# Patient Record
Sex: MALE | Race: OTHER
[De-identification: names, ages, dates, MRNs, and addresses within clinical notes are randomized per-mention and may not be internally consistent; named-entity substitution may affect disease eponyms.]

---

## 2019-06-06 VITALS — SYSTOLIC BLOOD PRESSURE: 136 MMHG | DIASTOLIC BLOOD PRESSURE: 70 MMHG

## 2020-01-22 ENCOUNTER — HOSPITAL ENCOUNTER (OUTPATIENT)
Dept: HOSPITAL 61 - ECHO | Age: 80
Discharge: HOME | End: 2020-01-22
Attending: INTERNAL MEDICINE
Payer: MEDICARE

## 2020-01-22 DIAGNOSIS — I50.22: ICD-10-CM

## 2020-01-22 DIAGNOSIS — I08.8: Primary | ICD-10-CM

## 2020-01-22 PROCEDURE — 93306 TTE W/DOPPLER COMPLETE: CPT

## 2020-01-22 NOTE — CARD
MR#: [id number]

Account#: [id number]

Accession#: [id number]

Date of Study: 01/22/2020

Ordering Physician: PIETER HINKLE, 

Referring Physician: PIETER HINKLE, 

Tech: Johanna Buckner RDCS





--------------- APPROVED REPORT --------------





EXAM: Two-dimensional and M-mode echocardiogram with Doppler and color Doppler.



Other Information 

Quality : AverageHR: 54bpm

Rhythm : NSR



INDICATION

Hypertension/HCVD

DM



2D DIMENSIONS 

RVDd3.0 (2.9-3.5cm)IVSd0.9 (0.7-1.1cm)

Aortic Root(2D)3.6 (2.0-3.7cm)LVDd5.2 (3.9-5.9cm)

LVOT Diameter2.2 (1.8-2.4cm)PWd1.1 (0.7-1.1cm)

LVDs4.2 (2.5-4.0cm)FS (%) 18.1 %

SV47.3 mlLVEF(%)37.3 (>50%)



Aortic Valve

AoV Peak Sylvain.101.7cm/Tyra Peak GR.4.1mmHg

LVOT Peak Sylvain.85.6cm/sAVA (VMAX)3.29cm2



Mitral Valve

MV E Hteducis23.2cm/sMV DECEL [id number]

MV A Kixhabdr19.6cm/sE/A  Ratio0.4

MV A Ogohgaha131kt



Pulmonary Valve

PV Peak Cvggfraw36.0cm/s



Tricuspid Valve

TR P. Dssiuvzf574ro/sTR Peak Gr.22mmHg



Pulmonary Vein

S1 Rbpejmrd64.9cm/sD2 Ntqkuqbi28.0cm/s

PVa [id number]



 LEFT VENTRICLE 

The left ventricle is normal size. There is normal left ventricular wall thickness. The left ventricu
lar systolic function is moderately diminished.  Severe hypokinesis of basal inferior and posterior w
alls. The ejection fraction is estimated at 30-35%. Transmitral Doppler flow pattern is Grade I-abnor
mal relaxation pattern. There is no ventricular septal defect visualized.



 RIGHT VENTRICLE 

The right ventricle is normal size. There is normal right ventricular wall thickness. The right ventr
icular systolic function is normal.



 ATRIA 

The left atrium size is normal. The right atrium size is normal. The interatrial septum is intact wit
h no evidence for an atrial septal defect or patent foramen ovale as noted on 2-D or Doppler imaging.




 AORTIC VALVE 

The aortic valve is normal in structure and function. Doppler and Color Flow revealed trace to mild a
ortic regurgitation. There is no significant aortic valvular stenosis.



 MITRAL VALVE 

The mitral valve is normal in structure and function. There is no evidence of mitral valve prolapse. 
There is no mitral valve stenosis. Doppler and Color-flow revealed mild mitral regurgitation.



 TRICUSPID VALVE 

The tricuspid valve is normal in structure and function. Doppler and Color Flow revealed mild tricusp
id regurgitation. PAP is estimated at 25 mmHg. There is no tricuspid valve stenosis.



 PULMONIC VALVE 

The pulmonary valve is normal in structure and function. Doppler and Color Flow revealed trace to mil
d pulmonic valvular regurgitation. There is no pulmonic valvular stenosis.



 GREAT VESSELS 

The aortic root is normal in size. The ascending aorta is normal in size. The IVC is normal in size a
nd collapses >50% with inspiration.



 PERICARDIAL EFFUSION 

There is no evidence of significant pericardial effusion.



Critical Notification

Critical Value: No



<Conclusion>

The left ventricular systolic function is moderately diminished.  

Severe hypokinesis of basal inferior and posterior walls.

The ejection fraction is estimated at 30-35%.

Transmitral Doppler flow pattern is Grade I-abnormal relaxation pattern.

Trace to mild aortic regurgitation.

Mild mitral regurgitation.

Mild tricuspid regurgitation. PAP is estimated at 25 mmHg.

There is no evidence of significant pericardial effusion.



Signed by : Pieter Hinkle, 

Electronically Approved : 01/22/2020 15:14:06

## 2020-03-09 ENCOUNTER — HOSPITAL ENCOUNTER (OUTPATIENT)
Dept: HOSPITAL 61 - SURG | Age: 80
Setting detail: OBSERVATION
LOS: 1 days | Discharge: HOME HEALTH | End: 2020-03-10
Attending: INTERNAL MEDICINE | Admitting: INTERNAL MEDICINE
Payer: MEDICARE

## 2020-03-09 VITALS — DIASTOLIC BLOOD PRESSURE: 86 MMHG | SYSTOLIC BLOOD PRESSURE: 187 MMHG

## 2020-03-09 VITALS — SYSTOLIC BLOOD PRESSURE: 172 MMHG | DIASTOLIC BLOOD PRESSURE: 76 MMHG

## 2020-03-09 VITALS — DIASTOLIC BLOOD PRESSURE: 76 MMHG | SYSTOLIC BLOOD PRESSURE: 152 MMHG

## 2020-03-09 VITALS — SYSTOLIC BLOOD PRESSURE: 176 MMHG | DIASTOLIC BLOOD PRESSURE: 85 MMHG

## 2020-03-09 VITALS — SYSTOLIC BLOOD PRESSURE: 182 MMHG | DIASTOLIC BLOOD PRESSURE: 80 MMHG

## 2020-03-09 VITALS — SYSTOLIC BLOOD PRESSURE: 164 MMHG | DIASTOLIC BLOOD PRESSURE: 79 MMHG

## 2020-03-09 VITALS — SYSTOLIC BLOOD PRESSURE: 187 MMHG | DIASTOLIC BLOOD PRESSURE: 86 MMHG

## 2020-03-09 VITALS — DIASTOLIC BLOOD PRESSURE: 84 MMHG | SYSTOLIC BLOOD PRESSURE: 190 MMHG

## 2020-03-09 VITALS — DIASTOLIC BLOOD PRESSURE: 79 MMHG | SYSTOLIC BLOOD PRESSURE: 167 MMHG

## 2020-03-09 VITALS — DIASTOLIC BLOOD PRESSURE: 71 MMHG | SYSTOLIC BLOOD PRESSURE: 159 MMHG

## 2020-03-09 VITALS — HEIGHT: 62 IN | WEIGHT: 129.19 LBS | BODY MASS INDEX: 23.77 KG/M2

## 2020-03-09 VITALS — SYSTOLIC BLOOD PRESSURE: 181 MMHG | DIASTOLIC BLOOD PRESSURE: 86 MMHG

## 2020-03-09 VITALS — SYSTOLIC BLOOD PRESSURE: 145 MMHG | DIASTOLIC BLOOD PRESSURE: 72 MMHG

## 2020-03-09 DIAGNOSIS — I44.7: ICD-10-CM

## 2020-03-09 DIAGNOSIS — Z79.01: ICD-10-CM

## 2020-03-09 DIAGNOSIS — I50.22: ICD-10-CM

## 2020-03-09 DIAGNOSIS — I11.0: ICD-10-CM

## 2020-03-09 DIAGNOSIS — I25.5: Primary | ICD-10-CM

## 2020-03-09 DIAGNOSIS — I25.10: ICD-10-CM

## 2020-03-09 DIAGNOSIS — I48.0: ICD-10-CM

## 2020-03-09 LAB
ANION GAP SERPL CALC-SCNC: 10 MMOL/L (ref 6–14)
BUN SERPL-MCNC: 19 MG/DL (ref 8–26)
CALCIUM SERPL-MCNC: 8.3 MG/DL (ref 8.5–10.1)
CHLORIDE SERPL-SCNC: 107 MMOL/L (ref 98–107)
CO2 SERPL-SCNC: 26 MMOL/L (ref 21–32)
CREAT SERPL-MCNC: 1 MG/DL (ref 0.7–1.3)
ERYTHROCYTE [DISTWIDTH] IN BLOOD BY AUTOMATED COUNT: 13.6 % (ref 11.5–14.5)
GFR SERPLBLD BASED ON 1.73 SQ M-ARVRAT: 72.1 ML/MIN
GLUCOSE SERPL-MCNC: 94 MG/DL (ref 70–99)
HCT VFR BLD CALC: 37.4 % (ref 39–53)
HGB BLD-MCNC: 12.7 G/DL (ref 13–17.5)
MCH RBC QN AUTO: 33 PG (ref 25–35)
MCHC RBC AUTO-ENTMCNC: 34 G/DL (ref 31–37)
MCV RBC AUTO: 97 FL (ref 79–100)
PLATELET # BLD AUTO: 148 X10^3/UL (ref 140–400)
POTASSIUM SERPL-SCNC: 3.5 MMOL/L (ref 3.5–5.1)
PROTHROMBIN TIME: 14.4 SEC (ref 11.7–14)
RBC # BLD AUTO: 3.88 X10^6/UL (ref 4.3–5.7)
SODIUM SERPL-SCNC: 143 MMOL/L (ref 136–145)
WBC # BLD AUTO: 4.9 X10^3/UL (ref 4–11)

## 2020-03-09 PROCEDURE — 33249 INSJ/RPLCMT DEFIB W/LEAD(S): CPT

## 2020-03-09 PROCEDURE — C1769 GUIDE WIRE: HCPCS

## 2020-03-09 PROCEDURE — G0378 HOSPITAL OBSERVATION PER HR: HCPCS

## 2020-03-09 PROCEDURE — G0379 DIRECT REFER HOSPITAL OBSERV: HCPCS

## 2020-03-09 PROCEDURE — 85027 COMPLETE CBC AUTOMATED: CPT

## 2020-03-09 PROCEDURE — 93005 ELECTROCARDIOGRAM TRACING: CPT

## 2020-03-09 PROCEDURE — C1882 AICD, OTHER THAN SING/DUAL: HCPCS

## 2020-03-09 PROCEDURE — C1898 LEAD, PMKR, OTHER THAN TRANS: HCPCS

## 2020-03-09 PROCEDURE — 85610 PROTHROMBIN TIME: CPT

## 2020-03-09 PROCEDURE — 71045 X-RAY EXAM CHEST 1 VIEW: CPT

## 2020-03-09 PROCEDURE — 71046 X-RAY EXAM CHEST 2 VIEWS: CPT

## 2020-03-09 PROCEDURE — 82962 GLUCOSE BLOOD TEST: CPT

## 2020-03-09 PROCEDURE — 33225 L VENTRIC PACING LEAD ADD-ON: CPT

## 2020-03-09 PROCEDURE — 75820 VEIN X-RAY ARM/LEG: CPT

## 2020-03-09 PROCEDURE — 80048 BASIC METABOLIC PNL TOTAL CA: CPT

## 2020-03-09 PROCEDURE — 96374 THER/PROPH/DIAG INJ IV PUSH: CPT

## 2020-03-09 PROCEDURE — 36415 COLL VENOUS BLD VENIPUNCTURE: CPT

## 2020-03-09 PROCEDURE — 96376 TX/PRO/DX INJ SAME DRUG ADON: CPT

## 2020-03-09 PROCEDURE — C1895 LEAD, AICD, ENDO DUAL COIL: HCPCS

## 2020-03-09 PROCEDURE — 93641 EP EVL 1/2CHMB PAC CVDFB TST: CPT

## 2020-03-09 PROCEDURE — C1900 LEAD, CORONARY VENOUS: HCPCS

## 2020-03-09 RX ADMIN — METOPROLOL TARTRATE SCH MG: 50 TABLET, FILM COATED ORAL at 17:35

## 2020-03-09 RX ADMIN — Medication SCH MG: at 20:30

## 2020-03-09 NOTE — RAD
AP portable chest  radiograph 3/9/2020

 

Clinical History: Post pacemaker placement.

 

An AP erect portable digital radiograph of the chest was obtained.

 

Comparison study is dated 6/5/2019.

 

A pacemaker is been placed into the left anterior chest. Leads extend to 

overlie the right atrium and right ventricle of the heart. The cardiac 

silhouette is mildly enlarged. The thoracic aorta is mildly tortuous. No 

pneumothorax or pleural effusion is seen. Prominence of pulmonary 

vasculature is seen suggesting mild CHF. Degenerative changes are seen 

involving the thoracic long the lumbar spine.

 

IMPRESSION: Post pacemaker placement. No pneumothorax is seen.

 

Electronically signed by: Torres Milner MD (3/9/2020 3:01 PM) [facility] [place]

## 2020-03-09 NOTE — CARD
MR#: [id number]

Account#: [id number]

Accession#: [id number]

Date of Study: 03/09/2020

Ordering Physician: PIETER HINKLE,

Referring Physician: PIETER HINKLE,

Tech: 





--------------- APPROVED REPORT --------------





EXAM

Implantation of Biotronik biventricular implantable cardioverter defibrillator/cardiac resynchronizat
ion therapy-defibrillation.

Defibrillation threshold regiment at the time of implantation.



FL TIME: 43.3 MINS

DOSE: 176 GYCM2

CONTRAST: 75 ML



INDICATIONS

Primary prevention of sudden cardiac death and cardiac resynchronization therapy in a patient with ch
ronic systolic heart failure, ischemic cardiomyopathy and cardiac dyssynchrony as evidenced by prolon
ged QRS interval



IMPLANTED DEVICES

After explaining the risks, benefits and alternative options, informed consent was obtained from rebecca
ent. Patient was brought to the cardiac Cath Lab and his left chest and shoulder were prepped and estephania
ped in the usual fashion. 20 mL of 2% lidocaine was infiltrated into the skin and subcutaneous tissue
s for local anesthesia. An incision was made over the left infraclavicular fossa and using blunt diss
ection and cautery a pocket was created. Venous access was obtained the left subclavian vein with the
 help of a venogram after attempts using fluoroscopy guidance failed, and 9 Cameroonian coronary sinus she
ath was inserted. Contrast injections were performed within the right atrium using CASS2 catheter and
 coronary sinus was engaged. With the balloontipped catheter inflated, venogram was obtained to ident
mai the cardiac veins for placement of left ventricular lead. We initially placed the left ventricle 
lead in the middle cardiac vein but after the sheath was removed the lead dislodged probably secondar
y to acute takeoff of the vein. We then tried the small cardiac vein but the thresholds were high. Fi
christian, a Biotronik quadripolar left ventricular lead model Sentus Pro MRI OTW QP L85, serial #[id number]
[id number] was positioned in the posterolateral vein.



Venous access was again obtained and 10.5 and 6 Cameroonian sheath inserted. A Biotronik bipolar active fi
xation right ventricle lead model Plexa ProMRI, serial #[id number] was positioned in the right ventricu
lar apex under fluoroscopy guidance. Finally, a Biotronik bipolar active fixation right atrial lead m
joselyn Solia serial #[id number] was positioned in the right atrial appendage under fluoroscopy guidance. 
The leads were secured into place and attached to a Biotronik biventricular ICD/CRT-D generator model
 Ilivia 7HF-T QP serial number [id number].  The generator was placed in the pocket that was subsequentl
y closed in 3 layers. Hemostasis was secured.



Ventricular fibrillation was then induced to check the defibrillation threshold. Patient successfully
 converted to sinus rhythm with 15 J shock therapy with a shock impedance of 65 ohms. The right atria
l lead showed a sensing amplitude of 4 mV, impedance of 510 ohms and a threshold of 0.5 was. The righ
t ventricular lead showed a sensing amplitude of 20 mV, impedance of 700 ohms and a threshold of 0.4 
V. The left ventricular lead showed a sensing amplitude of 10 mV, impedance of 860 ohms and a thresho
ld of 0.5 V. Patient tolerated the procedure well. There were no immediate complications.



CONCLUSION

Successful implantation of Biotronik biventricular ICD/CRT-D for primary prevention of sudden cardiac
 death and cardiac resynchronization therapy in a patient with chronic systolic heart failure and pro
longed QRS interval. Defibrillation thresholds were measured at the time of implantation.



Signed by : Pieter Hinkle, 

Electronically Approved : 03/09/2020 14:45:10

## 2020-03-09 NOTE — EKG
[facility]

              [address] [place] [address]

Test Date:    2020               Test Time:    10:22:56

Pat Name:     JOHN ANN      Department:   

Patient ID:   [id number]           Room:          

Gender:       M                        Technician:   

:          [id number]               Requested By: PIETER HINKLE

Order Number: [id number]           Reading MD:   Pieter Hinkle

                                 Measurements

Intervals                              Axis          

Rate:         53                       P:            30

DC:           210                      QRS:          -32

QRSD:         144                      T:            40

QT:           478                                    

QTc:          451                                    

                           Interpretive Statements

SINUS RHYTHM

ABNORMAL LEFT AXIS DEVIATION

LEFT ANTERIOR FASCICULAR BLOCK

NON SPECIFIC INTRAVENTRICULAR BLOCK



Electronically Signed On 3- 8:53:19 CDT by Pieter Hinkle

## 2020-03-10 VITALS — SYSTOLIC BLOOD PRESSURE: 174 MMHG | DIASTOLIC BLOOD PRESSURE: 81 MMHG

## 2020-03-10 VITALS — DIASTOLIC BLOOD PRESSURE: 80 MMHG | SYSTOLIC BLOOD PRESSURE: 160 MMHG

## 2020-03-10 VITALS
SYSTOLIC BLOOD PRESSURE: 162 MMHG | DIASTOLIC BLOOD PRESSURE: 77 MMHG | DIASTOLIC BLOOD PRESSURE: 77 MMHG | SYSTOLIC BLOOD PRESSURE: 162 MMHG

## 2020-03-10 RX ADMIN — Medication SCH MG: at 09:11

## 2020-03-10 RX ADMIN — METOPROLOL TARTRATE SCH MG: 50 TABLET, FILM COATED ORAL at 09:11

## 2020-03-10 NOTE — NUR
Discharge: Teaching verbal and written. Patient primarily Upper sorbian speaking. daughter 
translated discharge instructions. Patient and daughters verbalized understanding. Reviewed 
pacemaker precaution and education, wound care, labs, ect. Prescription for follow-up labs 
send with patient. Prescription for entreso sent to pharmacy by Virgie. All belongings with 
patient. Left arm immobilizer in place. Pacemaker box and card with patient. patient 
ambulated off of unit via wheelchair accompanied by nurse and daughter.

## 2020-03-10 NOTE — SNU/HH DC
DISCHARGE WITH HOME HEALTH


DISCHARGE INFORMATION:


Discharge Date:  Mar 10, 2020


Final Diagnosis:


ICM, chronic LBBB, CAD, HTN, HLP, S/P CRT-D placement


Condition on Discharge:  Stable





CODE STATUS:


Code Status:  Full





HOME HEALTH:


Face to Face:


I certify this patient is under my care and that I, or a nurse practitioner or 

physician's assistant working with me, had a face to face encounter that meets 

the physician face to face encounter requirements with this patient on [].


Medical Complications:  CHF, HTN, MI


RN For Eval/Treatment:  Yes


Physical Therapy For:  Evalulation/Treatment


Occupational Therapy For:  Evaluation/Treatment


Home Health Aide For:  Self-care


MSW For:  Community Resources


Pt Meets Homebound Status:  Unsteady balance w/ amb,





POST DISCHARGE ORDERS:


Activity Instructions for Disc:  Activity as tolerated, Other, see below (see 

post AICD placement pt.  instructions )


Weight Bearing Status after Di:  Full weight bearing


Other wound/incision instructi:  Keep surgical wound ALYSA





CHECKS AFTER DISCHARGE:


Checks after discharge:  Check blood press - daily, Weigh Yourself Daily


Comment:  2L Fluid restriction





FOLLOW-UP:


Follow up with:  with cardiology nurse on 3/24 at 1030 for wound check 

medication reeeval


Additional Instructions:


INR per PCP.  BMP in 1-2 weeks





TREATMENT/EQUIPMENT ORDERS:


Adaptive Equipment Issued:  None





CERTIFICATION STATEMENT:


Certification Statement:


Certification Statement: Based on the above finding, I certify that this patient

 is confined to the home and needs intermittent skilled nursing care, physical 

therapy and/or speech therapy, or continues to need occupational therapy.~ This 

patient is under my care, and I have initiated the establishment of the plan of 

care.~ This patient will be followed by myself or a community physician who will

 periodically review the plan of care.


Home Meds


Active Scripts


Sacubitril/Valsartan (Entresto 49 mg-51 mg Tablet) 1 Each Tablet, 1 EACH PO BID 

for ICM for 30 Days, #60 TAB 2 Refills


   Prov:JULI CAST         3/10/20


Ferrous Sulfate (FERROUS SULFATE) 325 Mg Tablet, 1 TAB PO BID for anemia, #60 

TAB 3 Refills


   Prov:JONATHAN LU MD         6/6/19


Furosemide (FUROSEMIDE) 40 Mg Tablet, 40 MG PO DAILY for chf for 30 Days, #30 

TAB


   Prov:JONATHAN LU MD         6/6/19


Reported Medications


Metformin Hcl (METFORMIN HCL) 500 Mg Tablet, 500 MG PO BIDWMEALS for ANTI-

DIABETIC, TAB 0 Refills


   6/4/19


Atorvastatin Calcium (ATORVASTATIN CALCIUM) 20 Mg Tablet, 20 MG PO HS for FOR 

CHOLESTEROL, #30 TAB 0 Refills


   6/4/19


Metoprolol Tartrate (METOPROLOL TARTRATE) 50 Mg Tablet, 1 TAB PO BID for htn, 

#60 TAB 5 Refills


   6/4/19


Warfarin Sodium (WARFARIN SODIUM) 2 Mg Tablet, 2 MG PO DAILY for blood thinner, 

#30 TAB


   6/4/19


Mirabegron (MYRBETRIQ) 25 Mg Tab.er.24h, 25 MG PO DAILY for bladder control, 

TAB.SR


   6/4/19


Tamsulosin Hcl (FLOMAX) 0.4 Mg Cap.er.24h, 2 CAP PO DAILY for retention, #30 CAP

 11 Refills


   6/4/19


Finasteride (FINASTERIDE) 5 Mg Tablet, 1 TAB PO DAILY for BPH, #30 TAB 11 

Refills


   6/4/19


Discontinued Reported Medications


Sacubitril/Valsartan (Entresto 24 mg-26 mg Tablet) 1 Each Tablet, 1 EACH PO 

DAILY for heart, TAB


   3/9/20


Cephalexin (CEPHALEXIN) 500 Mg Tablet, 500 MG PO BID for urinary infection, TAB


   3/9/20











JULI CAST          Mar 10, 2020 13:46 Requested Prescriptions     Pending Prescriptions Disp Refills    cloNIDine (CATAPRES) 0.3 MG tablet [Pharmacy Med Name: cloNIDine HCL 0.3 MG TABLET] 90 tablet 0     Sig: TAKE ONE TABLET BY MOUTH THREE TIMES A DAY     Last Fill Date: 08/02/2017  Last OV: 08/02/2017

## 2020-03-10 NOTE — PDOC3
JULI CAST APRN 3/10/20 1221:


Discharge Summary


Visit Information


Date of Admission:  Mar 9, 2020


Date of Discharge:  Mar 10, 2020


Admitting Diagnosis:  chronic LBBB, ICM, chronic systolic CHF, CAD, PAFIB


Final Diagnosis


S/P BiV ICD, chronic LBBB, ICM, chronic systolic CHF, CAD, PAFIB





Brief Hospital Course


Allergies





                                    Allergies








Coded Allergies Type Severity Reaction Last Updated Verified


 


  No Known Drug Allergies    6/4/19 No








Vital Signs





Vital Signs








  Date Time  Temp Pulse Resp B/P (MAP) Pulse Ox O2 Delivery O2 Flow Rate FiO2


 


3/10/20 09:11  65      


 


3/10/20 08:00      Room Air  


 


3/10/20 07:00 98.3  20 160/80 (106) 96   





 98.3       


 


3/9/20 15:40       2.0 








Lab Results





Laboratory Tests








Test


 3/9/20


10:15 3/9/20


17:14 3/9/20


20:46 3/10/20


07:42


 


White Blood Count


 4.9 x10^3/uL


(4.0-11.0) 


 


 





 


Red Blood Count


 3.88 x10^6/uL


(4.30-5.70) 


 


 





 


Hemoglobin


 12.7 g/dL


(13.0-17.5) 


 


 





 


Hematocrit


 37.4 %


(39.0-53.0) 


 


 





 


Mean Corpuscular Volume 97 fL ()    


 


Mean Corpuscular Hemoglobin 33 pg (25-35)    


 


Mean Corpuscular Hemoglobin


Concent 34 g/dL


(31-37) 


 


 





 


Red Cell Distribution Width


 13.6 %


(11.5-14.5) 


 


 





 


Platelet Count


 148 x10^3/uL


(140-400) 


 


 





 


Prothrombin Time


 14.4 SEC


(11.7-14.0) 


 


 





 


Prothromb Time International


Ratio 1.2 (0.8-1.1) 


 


 


 





 


Sodium Level


 143 mmol/L


(136-145) 


 


 





 


Potassium Level


 3.5 mmol/L


(3.5-5.1) 


 


 





 


Chloride Level


 107 mmol/L


() 


 


 





 


Carbon Dioxide Level


 26 mmol/L


(21-32) 


 


 





 


Anion Gap 10 (6-14)    


 


Blood Urea Nitrogen


 19 mg/dL


(8-26) 


 


 





 


Creatinine


 1.0 mg/dL


(0.7-1.3) 


 


 





 


Estimated GFR


(Cockcroft-Gault) 72.1 


 


 


 





 


Glucose Level


 94 mg/dL


(70-99) 


 


 





 


Calcium Level


 8.3 mg/dL


(8.5-10.1) 


 


 





 


Glucose (Fingerstick)


 


 116 mg/dL


(70-99) 131 mg/dL


(70-99) 69 mg/dL


(70-99)


 


Test


 3/10/20


11:30 


 


 





 


Glucose (Fingerstick)


 155 mg/dL


(70-99) 


 


 











Laboratory Tests








Test


 3/9/20


17:14 3/9/20


20:46 3/10/20


07:42 3/10/20


11:30


 


Glucose (Fingerstick)


 116 mg/dL


(70-99) 131 mg/dL


(70-99) 69 mg/dL


(70-99) 155 mg/dL


(70-99)








Brief Hospital Course


Mr. Ayala  is a 80 yo  male admitted for planned CRT-D placement.

His prior EF was 30-35%. He had a successful implantation of Biotronik 

biventricular ICD/CRT-D for primary prevention of sudden cardiac death and 

cardiac resynchronization therapy in a patient with chronic systolic heart 

failure and prolonged QRS interval. He tolerated the procedure well without 

immediate complications.  CXR with no pneumothorax repeat interrogation revealed

normal device. and Surgical pain is controlled, no SOA.. AOx3, LSCTA. No 

arrhythmias overnight and currently SR with bi-V pacing.  BP was labile 

initially but improving after resumption of BP meds. Will increase entresto. 

Ambulatory without difficulty, left surgical incision is intact with 

steristrips, no erythema or swelling. Neurovascular status to LUE intact and 

sling is in place to LUE. CAD is clinically stable and CHF is compensated. 

Discussed DC instructions with daughter and pt. No new medications. Follow up in

office in 2 weeks for wound check. Continue with home secondary prevention 

measures and HF regimen per GDMT. . BMP in 1-2 weeks





Discharge Information


Condition at Discharge:  Stable


Follow Up:  Weeks (2)


Disposition/Orders:  D/C to Home w/ HH


Scheduled


Atorvastatin Calcium (Atorvastatin Calcium) 20 Mg Tablet, 20 MG PO HS for FOR 

CHOLESTEROL, #30 Ref 0 (Reported)


   Entered as Reported by: AME LYNN on 6/4/19 0104


   Last Taken: Unknown Dose on 3/8/20      Last Action: Continued on 3/9/20 1645

by JUANA MELO


Ferrous Sulfate (Ferrous Sulfate) 325 Mg Tablet, 1 TAB PO BID for anemia, #60 

Ref 3


   Prescribed by: JONATHAN LU on 6/6/19 1044


   Last Taken: Unknown Dose on 3/8/20      Last Action: Continued on 3/9/20 1645

by JUANA MELO


Finasteride (Finasteride) 5 Mg Tablet, 1 TAB PO DAILY for BPH, #30 Ref 11 

(Reported)


   Entered as Reported by: AME LYNN on 6/4/19 2252


   Last Taken: Unknown Dose on 3/8/20      Last Action: Continued on 3/9/20 1645

by JUANA MELO


Furosemide (Furosemide) 40 Mg Tablet, 40 MG PO DAILY for chf for 30 Days, #30


   Prescribed by: JONATHAN LU on 6/6/19 1044


   Last Taken: Unknown Dose on 3/8/20      Last Action: Continued on 3/9/20 1645

by JUANA MELO


Metformin Hcl (Metformin Hcl) 500 Mg Tablet, 500 MG PO BIDWMEALS for ANTI-

DIABETIC, Ref 0 (Reported)


   Entered as Reported by: AME LYNN on 6/4/19 2259


   Last Taken: Unknown Dose on 3/8/20      Last Action: Continued on 3/9/20 1645

by JUANA MELO


Metoprolol Tartrate (Metoprolol Tartrate) 50 Mg Tablet, 1 TAB PO BID for htn, 

#60 Ref 5 (Reported)


   Entered as Reported by: AME LYNN on 6/4/19 2252


   Last Taken: Unknown Dose on 3/8/20      Last Action: Continued on 3/9/20 1645

by JUANA MELO


Mirabegron (Myrbetriq) 25 Mg Tab.er.24h, 25 MG PO DAILY for bladder control, 

(Reported)


   Entered as Reported by: AME LYNN on 6/4/19 2252


   Last Taken: Unknown Dose on 3/8/20      Last Action: Converted on 3/9/20 1645

by JUANA MELO


Sacubitril/Valsartan (Entresto 49 mg-51 mg Tablet) 1 Each Tablet, 1 EACH PO BID 

for ICM for 30 Days, #60 Ref 2


   Prescribed by: JULI CAST on 3/10/20 1335


Tamsulosin Hcl (Flomax) 0.4 Mg Cap.er.24h, 2 CAP PO DAILY for retention, #30 Ref

11 (Reported)


   Entered as Reported by: AME LYNN on 6/4/19 2252


   Last Taken: Unknown Dose on 3/8/20      Last Action: Continued on 3/9/20 1645

by JUANA MELO


Warfarin Sodium (Warfarin Sodium) 2 Mg Tablet, 2 MG PO DAILY for blood thinner, 

#30 (Reported)


   Entered as Reported by: AME LYNN on 6/4/19 2252


   Last Taken: Unknown Dose on 3/3/20      Last Action: Continued on 3/9/20 1645

by JUANA MELO





Discontinued Medications


Cephalexin (Cephalexin) 500 Mg Tablet, 500 MG PO BID for urinary infection, 

(Reported)


   Entered as Reported by: JUANA MELO on 3/9/20 1643


   Last Action: Converted on 3/9/20 1645 by JUANA MELO


Sacubitril/Valsartan (Entresto 24 mg-26 mg Tablet) 1 Each Tablet, 1 EACH PO 

DAILY for heart, (Reported)


   Entered as Reported by: JUANA MELO on 3/9/20 1657


   Last Action: Continued on 3/9/20 1713 by JUANA MELO





Patient Instructions


Patient Instructions


Must know & what to expect after device implant:





1.   Your surgical dressing should be removed prior to discharge from the 

hospital, but allow the steri- strips to fall off naturally.


2.   Activity restrictions:  DO NOT raise arm above shoulder level, lift 

anything heavier than a gallon of milk, and no push or pull motions such as  


       vacuuming/lawn mowing, no swinging motions (golf), etc for 4 weeks. 


3.   It is OK to use a cell phone or other electronic devices just be sure you 

do not store it in a breast pocket on the side where the device was placed. 


4.   Device will be interrogated prior to your discharge from the hospital and 

then every 3 months for defibrillators and every 6 months for pacemakers.  You  


      may be asked to have your device checked remotely from home as well, but 

this will depend on your particular physicians preference.


5.   You may remove the arm immobilizer the day after device placement.  Wear 

the arm immobilizer/splint at night (during sleep times) for 2 week to  


       prevent unintended arm movement that can cause lead dislodgement.


6.   Do not drive for one week as the task of driving may lead to unintended arm

motion that may cause lead dislodgement.  The seatbelt will also rub  


      against the incision site & cause irritation.


7.   It is our recommendation that you utilize Tylenol at home for pain control.

 You need to call our office if you are having uncontrollable pain at the  


      incision site.


8.   Keep your incision clean and dry.  It is OK to shower.  DO NOT submerge in 

bath, pool, or hot tub, until cleared by your doctor, as this could lead to  


      increase risk of infection..  It is OK to use regular soap just do not 

scrub the incision site.  Water spray from shower should not directly hit the  


      incision.  Be sure to blot dry not rub.


9.   Inspect your incision daily.  If you notice any increased redness, 

swelling, or drainage, or if you start running a fever, call the office 

immediately.  The  


      number is [phone]. 


10.   For women, if you need to protect against irritation from the bra straps, 

you can place a piece of gauze over the incision site for cushion.  Please be  


      sure to tape it loosely to allow air to the site & remove the gauze when 

you remove the bra.


11.   Be sure to carry your device identification information card in your 

wallet/purse at all times.


12.   It is OK to go through security at the airport with your device, but be 

sure to let the TSA know prior to proceeding as the security settings change  


      depending on varying factors.  Please do whatever is requested by security

at that time.


13.   Some of the newer devices may be MRI compatible but, currently, the use of

these devices is not widespread, so you likely will not be able to have an  


      MRI.  Please clarify this with your physician.





Special instructions for defibrillator patients:





If your device recognizes a rhythm that requires treatment with a shock, you 

will most likely feel the shock.  This is usually not a subtle feeling and it is

uncomfortable.  Please follow these steps if you receive a shock:


      


Call the office if you receive one shock.


Go to the emergency room if you receive two consecutive shocks- please have 

someone drive you  & call 911 if nobody is available- DO NOT drive yourself.


      Call 911 if you receive more than 2 consecutive shocks.


      


If at any time, you feel lightheaded or dizzy/faint, stop what you are doing & 

lie down immediately.  If you are driving, get to the side of the road quickly, 

turn your car off & call 911 on your cell phone.  DO NOT continue to drive as 

this may cause an accident that seriously injures yourself &/or others. 





Call the office at [phone] for any questions or concerns.





PIETER TAM MD 3/10/20 1513:


Discharge Summary


Brief Hospital Course


Brief Hospital Course


Patient seen and examined. Agree with NP's assessment and plan.


Patient underwent successful biventricular ICD/CRT-D implantation yesterday.


Incision looks good. Chest x-ray without any pneumothorax.


Device interrogation showed normal function.


Agree with increasing entresto dose for better blood pressure control.


Follow-up with our office as scheduled.





Discharge Information


Scheduled


Atorvastatin Calcium (Atorvastatin Calcium) 20 Mg Tablet, 20 MG PO HS for FOR 

CHOLESTEROL, #30 Ref 0 (Reported)


   Entered as Reported by: AME LYNN on 6/4/19 2259


   Last Taken: Unknown Dose on 3/8/20      Last Action: Continued on 3/9/20 1645

by JUANA MELO


Ferrous Sulfate (Ferrous Sulfate) 325 Mg Tablet, 1 TAB PO BID for anemia, #60 

Ref 3


   Prescribed by: JONATHAN LU on 6/6/19 1044


   Last Taken: Unknown Dose on 3/8/20      Last Action: Continued on 3/9/20 1645

by JUANA MELO


Finasteride (Finasteride) 5 Mg Tablet, 1 TAB PO DAILY for BPH, #30 Ref 11 

(Reported)


   Entered as Reported by: AME LYNN on 6/4/19 2252


   Last Taken: Unknown Dose on 3/8/20      Last Action: Continued on 3/9/20 1645

by JUANA MELO


Furosemide (Furosemide) 40 Mg Tablet, 40 MG PO DAILY for chf for 30 Days, #30


   Prescribed by: JONATHAN LU on 6/6/19 1044


   Last Taken: Unknown Dose on 3/8/20      Last Action: Continued on 3/9/20 1645

by JUANA MELO


Metformin Hcl (Metformin Hcl) 500 Mg Tablet, 500 MG PO BIDWMEALS for ANTI-

DIABETIC, Ref 0 (Reported)


   Entered as Reported by: AME LYNN on 6/4/19 2259


   Last Taken: Unknown Dose on 3/8/20      Last Action: Continued on 3/9/20 1645

by JUANA MELO


Metoprolol Tartrate (Metoprolol Tartrate) 50 Mg Tablet, 1 TAB PO BID for htn, 

#60 Ref 5 (Reported)


   Entered as Reported by: AME LYNN on 6/4/19 2252


   Last Taken: Unknown Dose on 3/8/20      Last Action: Continued on 3/9/20 1645

by JUANA MELO


Mirabegron (Myrbetriq) 25 Mg Tab.er.24h, 25 MG PO DAILY for bladder control, 

(Reported)


   Entered as Reported by: AME LYNN on 6/4/19 2252


   Last Taken: Unknown Dose on 3/8/20      Last Action: Converted on 3/9/20 1645

by JUANA MELO


Sacubitril/Valsartan (Entresto 49 mg-51 mg Tablet) 1 Each Tablet, 1 EACH PO BID 

for ICM for 30 Days, #60 Ref 2


   Prescribed by: JULI CAST on 3/10/20 1335


Tamsulosin Hcl (Flomax) 0.4 Mg Cap.er.24h, 2 CAP PO DAILY for retention, #30 Ref

11 (Reported)


   Entered as Reported by: AME LYNN on 6/4/19 2252


   Last Taken: Unknown Dose on 3/8/20      Last Action: Continued on 3/9/20 1645

by JUANA MELO


Warfarin Sodium (Warfarin Sodium) 2 Mg Tablet, 2 MG PO DAILY for blood thinner, 

#30 (Reported)


   Entered as Reported by: AME LYNN on 6/4/19 2252


   Last Taken: Unknown Dose on 3/3/20      Last Action: Continued on 3/9/20 1645

by JUANA MELO





Discontinued Medications


Cephalexin (Cephalexin) 500 Mg Tablet, 500 MG PO BID for urinary infection, 

(Reported)


   Entered as Reported by: JUANA MELO on 3/9/20 1643


   Last Action: Converted on 3/9/20 1645 by JUANA MELO


Sacubitril/Valsartan (Entresto 24 mg-26 mg Tablet) 1 Each Tablet, 1 EACH PO 

DAILY for heart, (Reported)


   Entered as Reported by: JUANA MELO on 3/9/20 1657


   Last Action: Continued on 3/9/20 1713 by JULI HINTON          Mar 10, 2020 12:21


PIETER TAM MD           Mar 10, 2020 15:13

## 2020-03-10 NOTE — RAD
Chest radiograph 3/10/2020 2:29 PM

 

INDICATION: One day post pacemaker implantation

 

COMPARISON: March 9, 2020

 

TECHNIQUE: Frontal and lateral views of the chest are provided.

 

FINDINGS:

 

The cardiomediastinal silhouette is within normal limits.

 

Left chest wall cardiac device is identified in similar position. Improved

pulmonary vascular congestion. No pleural effusions or pneumothorax.

 

IMPRESSION:

 

No acute cardiopulmonary process.

 

Electronically signed by: Rani Chavez MD (3/10/2020 8:55 AM) 

[id number]

## 2020-03-10 NOTE — NUR
SS following up with discharge planning. SS reviewed pt chart. Pt is from home and is 
currently on room air. Discharge orders received for home healthcare. SS received 
notification that pt was with [facility], [phone]; fax [phone]. SS 
phoned and faxed discharge orders and referral to [facility]. Pt's RN 
notified.

## 2022-03-14 ENCOUNTER — HOSPITAL ENCOUNTER (OUTPATIENT)
Dept: HOSPITAL 61 - NM | Age: 82
End: 2022-03-14
Attending: INTERNAL MEDICINE
Payer: MEDICARE

## 2022-03-14 DIAGNOSIS — I08.0: Primary | ICD-10-CM

## 2022-03-14 DIAGNOSIS — I25.10: ICD-10-CM

## 2022-03-14 PROCEDURE — 78452 HT MUSCLE IMAGE SPECT MULT: CPT

## 2022-03-14 PROCEDURE — A9500 TC99M SESTAMIBI: HCPCS

## 2022-03-14 PROCEDURE — 93017 CV STRESS TEST TRACING ONLY: CPT

## 2022-03-14 PROCEDURE — C8929 TTE W OR WO FOL WCON,DOPPLER: HCPCS

## 2022-03-14 PROCEDURE — 93306 TTE W/DOPPLER COMPLETE: CPT

## 2022-03-14 NOTE — CARD
MR#: [id number]

Account#: [id number]

Accession#: [id number]

Date of Study: 03/14/2022

Ordering Physician: PIETER TAM, 

Referring Physician: PIETER TAM 

Tech: Latricia Sorto [facility]





--------------- APPROVED REPORT --------------





EXAM: Two-dimensional and M-mode echocardiogram with Doppler and color Doppler.



Other Information 

Quality : AverageHR: 67bpm

Rhythm : NSR



INDICATION

Cardiac Disease: CAD 



RISK FACTORS

Hypertension 

Hyperlipidemia



2D DIMENSIONS 

RVDd2.9 (2.9-3.5cm)Left Atrium(2D)3.8 (1.6-4.0cm)

IVSd1.3 (0.7-1.1cm)Aortic Root(2D)3.5 (2.0-3.7cm)

LVDd4.0 (3.9-5.9cm)LVOT Diameter2.2 (1.8-2.4cm)

PWd1.3 (0.7-1.1cm)LVDs2.7 (2.5-4.0cm)

FS (%) 31.8 %SV42.6 ml

LVEF(%)60.4 (>50%)



Aortic Valve

AoV Peak Sylvain.86.7cm/sAoV VTI17.6cm

AO Peak GR.3.0mmHgLVOT Peak Sylvain.74.5cm/s

AO Mean GR.2mmHgAVA (VMAX)3.15cm2



Mitral Valve

MV E Lwqkmbjg97.3cm/sMV DECEL [id number]

MV A Hwvvludm38.2cm/sE/A  Ratio0.5



Pulmonary Valve

PV Peak Aaqokohm84.8cm/s



Tricuspid Valve

TR P. Mpwxiwwz155kp/sTR Peak Gr.18mmHg



 LEFT VENTRICLE 

The left ventricle is normal size. There is mild concentric left ventricular hypertrophy. Moderate gl
obal left ventricular systolic dysfunction. Severe hypokinesis of posterobasal wall. The ejection fra
ction is 35%. Transmitral Doppler flow pattern is Grade I-abnormal relaxation pattern.



 RIGHT VENTRICLE 

The right ventricle is normal size. There is normal right ventricular wall thickness. The right ventr
icular systolic function is normal.



 ATRIA 

The left atrium size is normal. The right atrium size is normal. The interatrial septum is intact wit
h no evidence for an atrial septal defect or patent foramen ovale as noted on 2-D or Doppler imaging.




 AORTIC VALVE 

The aortic valve is normal in structure and function. Doppler and Color Flow revealed mild aortic reg
urgitation. There is no significant aortic valvular stenosis.



 MITRAL VALVE 

The mitral valve is normal in structure and function. There is no evidence of mitral valve prolapse. 
There is no mitral valve stenosis. Doppler and Color-flow revealed mild mitral regurgitation.



 TRICUSPID VALVE 

The tricuspid valve is normal in structure and function. Doppler and Color Flow revealed trace tricus
pid regurgitation. Estimated PAP 22-25 mmHg. There is no tricuspid valve stenosis.



 GREAT VESSELS 

The aortic root is normal in size. The ascending aorta is normal in size. The IVC is normal in size a
nd collapses >50% with inspiration.



 PERICARDIAL EFFUSION 

There is no evidence of significant pericardial effusion.



Critical Notification

Critical Value: No



<Conclusion>

Moderate global left ventricular systolic dysfunction.

Severe hypokinesis of posterobasal wall.

The ejection fraction is 35%.

Transmitral Doppler flow pattern is Grade I-abnormal relaxation pattern.

Mild aortic regurgitation.

Mild mitral regurgitation.

Trace tricuspid regurgitation.  Estimated PAP 22-25 mmHg.

There is no evidence of significant pericardial effusion.



Signed by : Pieter Tam, 

Electronically Approved : 03/14/2022 15:55:59

## 2022-03-15 NOTE — RAD
MR#: [id number]

Account#: [id number]

Accession#: [id number]

Date of Study: 03/14/2022

Ordering Physician: PIETER TAM, 

Referring Physician: ALEJANDRO BEYER Tech: RT MATILDE Villalta) (N)





--------------- APPROVED REPORT --------------





Test Type:          Pharmacological

Stress Nurse/Tech: Jelena Burnett R.N.

Test Indications: cad

Cardiac History: cad, htn, pacemaker, smoker, stroke, dm

Medications:     see ehr

Medical History: see ehr

Resting ECG:     AV paced

Resting Heart Rate: 80 bpm

Resting Blood Pressure: 122/66mmHg

Pretest Chest Pain: No chest pain



Nurse/Tech Notes

lungs cta, heart tones regular

Consent: The procedure was explained to the patient in lay terms. Informed consent was witnessed. Josef
eout was entered into [employer]. History and Stress Test performed by RT MATILDE Villalta) (N)



Pharm. Details

Pharmacologic stress testing was performed using 0.4mg per 5ml of regadenoson given intravenously ove
r 7-10 seconds.



Stress Symptoms

No chest pain or symptoms.



POST EXERCISE

Reason for Termination: Infusion complete

Target HR: No

Max HR: 118 bpm

Max Blood Pressure: 119/61mmHg

Chest Pain: No. 

Arrhythmia: No. 

ST Change: No. 



INTERPRETATION

Stress EKG Conclusion: Baseline EKG showed AV paced rhythm.  Nondiagnostic changes at peak stress.  N
o arrhythmias.



Imaging Protocol

IMAGE PROTOCOL: Rest Tc-99m/stress Tc-99m 1 day



Rest:            Stress:         Viability:   

Radiopharm.Tc99m SfajwcmqsZf02i Sestamibi

Swmk9pOt            31.3mCi            

Duration    15min.           10min.           

Img Date  03/14/2022 03/14/2022      

Inj-Img [id number].           60min.           



Rest Admin Site:IV - Left AntecubitalAdministrator:RT MATILDE Capellan)(N)

Stress Admin Site: IV - Left AntecubitalAdministrator: RT MATILDE Villalta)(N)



STRESS DATA

End Diast. Vol.82.0mlAv. Heart Rate71.0bpm

End Syst. Vol.40.0mlCO Index BSA0.0L/min

Myocardial Gbam425.0gEject. Tbgfhali47.0%



Stress Rates

Pk. Fill Rate2.08EDV/secLVtime Pk. Fill 112.70msec

Pk. Empty Rate2.68ESV/secLVtime Pk. Ucxsw355.96msec

1/3 Pk. Fill1.51EDV/sec



Stress Scores

Regional WT3.00Summed WT33.00

Regional WM0.00Summed WM12.00



LV Perfusion

Scintigraphic images did not show any significant fixed or reversible defects.



Wall Motion

Mild left ventricular systolic dysfunction with ejection fraction calculated at 48%.



LV Perf. Quant

17 Seg. SSS0.00

17 Seg. SRS0.00

17 Seg. SDS0.00

Stress Defect Extent (% LAD)0.00Rest Defect Extent (% LAD)0.00Rev. Defect Extent (% LAD)0.00

Stress Defect Extent (% LCX) 0.00Rest Defect Extent (% LCX)0.00Rev. Defect Extent (% LCX)0.00

Stress Defect Extent (% RCA)0.00Rest Defect Extent (% RCA)0.00Rev. Defect Extent (% RCA)0.00

Stress Defect Extent (% MOHAMUD)0.00Rest Defect Extent (% MOHAMUD)0.00Rev. Defect Extent (% MOHAMUD)0.00



Conclusion

1. Regadenoson cardioisotope stress test did not show any evidence of ischemia or infarct.

2. Mild left ventricular systolic dysfunction with ejection fraction calculated at 48%.

3. Low risk for cardiac events.



Signed by : Pieter Tam, 

Electronically Approved : 03/15/2022 10:49:54